# Patient Record
Sex: MALE | Race: WHITE | NOT HISPANIC OR LATINO | URBAN - METROPOLITAN AREA
[De-identification: names, ages, dates, MRNs, and addresses within clinical notes are randomized per-mention and may not be internally consistent; named-entity substitution may affect disease eponyms.]

---

## 2018-11-19 ENCOUNTER — OFFICE VISIT - HEALTHEAST (OUTPATIENT)
Dept: FAMILY MEDICINE | Facility: CLINIC | Age: 28
End: 2018-11-19

## 2018-11-19 DIAGNOSIS — Z82.49 FAM HX-ISCHEM HEART DISEASE: ICD-10-CM

## 2018-11-19 DIAGNOSIS — Z00.00 ROUTINE GENERAL MEDICAL EXAMINATION AT A HEALTH CARE FACILITY: ICD-10-CM

## 2018-11-19 DIAGNOSIS — F41.9 ANXIETY: ICD-10-CM

## 2018-11-19 ASSESSMENT — MIFFLIN-ST. JEOR: SCORE: 2028.8

## 2018-11-19 NOTE — ASSESSMENT & PLAN NOTE
Reduction in alcohol recommended.  Patient is not interested in the laboratory test today.  We will plan on fasting lipids at his annual visit next year.

## 2018-11-19 NOTE — ASSESSMENT & PLAN NOTE
The patient has been prescribed medications in the past.  He is not interested in any medications today.  Anxiety continues to be intermittent and at times bothersome with somatic symptoms.  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  He is planning on discontinuing his participation in the [employer].  -Follow-up as needed per patient criteria.  -I recommended a reduction in alcohol consumption.

## 2018-11-19 NOTE — ASSESSMENT & PLAN NOTE
Weight is been stable.  Previous measurements for diabetic screening and hypercholesterolemia screening have been normal/negative.  Will defer blood tests until next year.  Follow-up as needed for support with anxiety.  Family history reviewed.

## 2021-06-02 VITALS — WEIGHT: 229 LBS | HEIGHT: 72 IN | BODY MASS INDEX: 31.02 KG/M2

## 2021-06-16 PROBLEM — Z00.00 ROUTINE GENERAL MEDICAL EXAMINATION AT A HEALTH CARE FACILITY: Status: ACTIVE | Noted: 2018-11-19

## 2021-06-26 NOTE — PROGRESS NOTES
Progress Notes by Kevin Jones MD at 11/19/2018  1:00 PM     Author: Kevin Jones MD Service: -- Author Type: Physician    Filed: 11/19/2018  2:33 PM Encounter Date: 11/19/2018 Status: Signed    : Kevin Jones MD (Physician)       MALE PREVENTATIVE EXAM    Assessment and Plan:       Problem List Items Addressed This Visit     Anxiety     The patient has been prescribed medications in the past.  He is not interested in any medications today.  Anxiety continues to be intermittent and at times bothersome with somatic symptoms.  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  He is planning on discontinuing his participation in the [employer].  -Follow-up as needed per patient criteria.  -I recommended a reduction in alcohol consumption.         Fam hx-ischem heart disease     Reduction in alcohol recommended.  Patient is not interested in the laboratory test today.  We will plan on fasting lipids at his annual visit next year.           Routine general medical examination at a health care facility - Primary     Weight is been stable.  Previous measurements for diabetic screening and hypercholesterolemia screening have been normal/negative.  Will defer blood tests until next year.  Follow-up as needed for support with anxiety.  Family history reviewed.             Next follow up:  Return in about 1 year (around 11/19/2019) for Anxiety, Annual physical, fasting lab work..    Immunization Review  Adult Imm Review: No immunizations due today  Appropriate consumption of alcohol advice given as per AVS.    BMI: Weight loss recommended.  I specifically recommended that he reduce alcohol consumption, the consumption of processed/refined carbohydrates.  Pt does not use tobacco products   I discussed the following with the patient:   Adult Healthy Living: Importance of regular exercise  Healthy nutrition  Getting  adequate sleep  Supplement use  Herbal medications/alternative medical therapies    I have had an Advance Directives discussion with the patient.    Subjective:   Chief Complaint: Izaiah Munguia is an 27 y.o. male here for a preventative health visit.     HPI:  Stress.  He is hoping to get out of the  soon.  [employer].  Most health concerns are stress related.  He has been prescribed paxil in the past but did not really take it.     Healthy Habits  Are you taking a daily aspirin? No  Do you typically exercising at least 40 min, 3-4 times per week?  Yes  Do you usually eat at least 4 servings of fruit and vegetables a day, include whole grains and fiber and avoid regularly eating high fat foods? Yes  Have you had an eye exam in the past two years? Yes  Do you see a dentist twice per year? Yes  Do you have any concerns regarding sleep? No    Safety Screen  If you own firearms, are they secured in a locked gun cabinet or with trigger locks? NO  Do you feel you are safe where you are living?: Yes (11/19/2018  1:11 PM)  Do you feel you are safe in your relationship(s)?: Yes (11/19/2018  1:11 PM)     Review of Systems:  Please see above.  The rest of the review of systems are negative for all systems.     Cancer Screening     Patient has no health maintenance due at this time          Patient Care Team:  Kevin Jones MD as PCP - General (Family Medicine)        History     Reviewed By Date/Time Sections Reviewed    Kevin Jones MD 11/19/2018  1:24 PM Tobacco, Alcohol, Drug Use, Sexual Activity    Kevin Jones MD 11/19/2018  1:22 PM Surgical    Kevni Jones MD 11/19/2018  1:21 PM Medical    Radha Holley LPN 11/19/2018  1:11 PM Tobacco    Radha Holley LPN 11/19/2018  1:09 PM Tobacco    Radha Holley LPN 11/19/2018  1:08 PM Family    Radha Holley LPN 11/19/2018  1:07 PM Surgical, Family    Radha Holley LPN 11/19/2018  1:06 PM Tobacco, Alcohol,  "Drug Use, Sexual Activity            Objective:   Vital Signs:   Visit Vitals  /80 (Patient Site: Right Arm, Patient Position: Sitting, Cuff Size: Adult Large)   Pulse 88   Resp 20   Ht 5' 11.5\" (1.816 m)   Wt (!) 229 lb (103.9 kg)   SpO2 98% Comment: room air   BMI 31.49 kg/m      PHYSICAL EXAM  Physical Exam   Constitutional: He is oriented to person, place, and time. He appears well-developed. No distress.   HENT:   Head: Normocephalic and atraumatic.   Right Ear: External ear normal.   Left Ear: External ear normal.   Nose: Nose normal.   Mouth/Throat: Oropharynx is clear and moist. No oropharyngeal exudate.   Eyes: Conjunctivae and EOM are normal. Pupils are equal, round, and reactive to light. Right eye exhibits no discharge. Left eye exhibits no discharge. No scleral icterus.   Neck: Normal range of motion. No thyromegaly present.   Cardiovascular: Normal rate, regular rhythm, normal heart sounds and intact distal pulses. Exam reveals no gallop and no friction rub.   No murmur heard.  Pulmonary/Chest: Effort normal and breath sounds normal. No respiratory distress. He has no wheezes.   Abdominal: Soft. He exhibits no distension and no mass. There is no tenderness.   Genitourinary: Testes normal. Right testis shows no mass. Left testis shows no mass.         Musculoskeletal: Normal range of motion. He exhibits no edema.   Lymphadenopathy:     He has no cervical adenopathy.   Neurological: He is alert and oriented to person, place, and time. He has normal reflexes. No cranial nerve deficit. He exhibits normal muscle tone.   Skin: Skin is warm.   Psychiatric: He has a normal mood and affect. His behavior is normal. Judgment and thought content normal.   Vitals reviewed.       Medication List      as of 11/19/2018  2:32 PM     You have not been prescribed any medications.         Additional Screenings Completed Today:          "

## 2021-08-22 ENCOUNTER — HEALTH MAINTENANCE LETTER (OUTPATIENT)
Age: 31
End: 2021-08-22

## 2021-10-17 ENCOUNTER — HEALTH MAINTENANCE LETTER (OUTPATIENT)
Age: 31
End: 2021-10-17

## 2022-08-29 ENCOUNTER — OFFICE VISIT (OUTPATIENT)
Dept: FAMILY MEDICINE | Facility: CLINIC | Age: 32
End: 2022-08-29
Payer: COMMERCIAL

## 2022-08-29 VITALS
WEIGHT: 250.7 LBS | SYSTOLIC BLOOD PRESSURE: 136 MMHG | OXYGEN SATURATION: 99 % | TEMPERATURE: 98.4 F | HEART RATE: 100 BPM | HEIGHT: 72 IN | DIASTOLIC BLOOD PRESSURE: 82 MMHG | RESPIRATION RATE: 16 BRPM | BODY MASS INDEX: 33.96 KG/M2

## 2022-08-29 DIAGNOSIS — H61.21 IMPACTED CERUMEN OF RIGHT EAR: ICD-10-CM

## 2022-08-29 DIAGNOSIS — Z13.220 SCREENING FOR LIPID DISORDERS: ICD-10-CM

## 2022-08-29 DIAGNOSIS — Z00.00 ROUTINE GENERAL MEDICAL EXAMINATION AT A HEALTH CARE FACILITY: Primary | ICD-10-CM

## 2022-08-29 DIAGNOSIS — Z13.1 SCREENING FOR DIABETES MELLITUS: ICD-10-CM

## 2022-08-29 PROCEDURE — 99385 PREV VISIT NEW AGE 18-39: CPT | Mod: 25 | Performed by: FAMILY MEDICINE

## 2022-08-29 PROCEDURE — 69209 REMOVE IMPACTED EAR WAX UNI: CPT | Performed by: FAMILY MEDICINE

## 2022-08-29 ASSESSMENT — ENCOUNTER SYMPTOMS
JOINT SWELLING: 0
PARESTHESIAS: 0
DIZZINESS: 0
NAUSEA: 0
HEMATURIA: 0
CONSTIPATION: 0
HEMATOCHEZIA: 0
DIARRHEA: 0
NERVOUS/ANXIOUS: 0
WEAKNESS: 0
COUGH: 0
HEARTBURN: 0
EYE PAIN: 0
ABDOMINAL PAIN: 0
FEVER: 0
FREQUENCY: 0
CHILLS: 0
ARTHRALGIAS: 0
SHORTNESS OF BREATH: 0
PALPITATIONS: 0
DYSURIA: 0
HEADACHES: 0
SORE THROAT: 0
MYALGIAS: 0

## 2022-08-29 NOTE — ASSESSMENT & PLAN NOTE
The patient made some but not all of the criteria for metabolic syndrome.  Fasting lab work is indicated.  We discussed alcohol in the context of metabolic syndrome.  Lifestyle is sedentary.  Discussed adding different modalities of exercise.  Tetanus booster will be administered next week (currently out of stock in clinic).  We will plan for follow-up visit in 1 year to review health improvements.

## 2022-08-29 NOTE — PROGRESS NOTES
SUBJECTIVE:   CC: Izaiah Munguia is an 31 year old male who presents for preventative health visit.     Ear on the right side is plugged.  History of stress.    Nutrution:   - sedentary.  Works from home.  Works from [employer].  ~40+ hours/week.   - enjoys cooks, wine.  Enjoys eating veggies.    - he has a routine that includes three meals.  Sometimes a protein based breakfast.  Brines sandwich. Dinner   - alcohol: 2-3 drinks per day   - sleep: 9-10pm. Up with daughter at night.    - has  daughter.    Patient has been advised of split billing requirements and indicates understanding: Yes  Healthy Habits:     Getting at least 3 servings of Calcium per day:  Yes    Bi-annual eye exam:  NO    Dental care twice a year:  NO    Sleep apnea or symptoms of sleep apnea:  None    Diet:  Regular (no restrictions)    Frequency of exercise:  2-3 days/week    Duration of exercise:  30-45 minutes    Taking medications regularly:  Yes    Medication side effects:  Not applicable    PHQ-2 Total Score: 0    Additional concerns today:  No    Today's PHQ-2 Score:   PHQ-2 (  [employer]) 2022   Q1: Little interest or pleasure in doing things 0   Q2: Feeling down, depressed or hopeless 0   PHQ-2 Score 0   Q1: Little interest or pleasure in doing things Not at all   Q2: Feeling down, depressed or hopeless Not at all   PHQ-2 Score 0     Abuse: Current or Past(Physical, Sexual or Emotional)- No  do you feel safe in your environment? Yes    Social History     Tobacco Use     Smoking status: Former Smoker     Smokeless tobacco: Never Used     Tobacco comment: Cigars   Substance Use Topics     Alcohol use: Yes     Alcohol/week: 20.0 standard drinks     Comment: Alcoholic Drinks/day: variable.  He says that he is drinking less (2018)     If you drink alcohol do you typically have >3 drinks per day or >7 drinks per week? Yes      Alcohol Use 2022   Prescreen: >3 drinks/day or >7 drinks/week? Yes   Prescreen: >3 drinks/day  or >7 drinks/week? -   AUDIT SCORE  6     AUDIT - Alcohol Use Disorders Identification Test - Reproduced from the World Health Organization Audit 2001 (Second Edition) 8/29/2022   1.  How often do you have a drink containing alcohol? 2 to 3 times a week   2.  How many drinks containing alcohol do you have on a typical day when you are drinking? 3 or 4   3.  How often do you have five or more drinks on one occasion? Monthly   4.  How often during the last year have you found that you were not able to stop drinking once you had started? Never   5.  How often during the last year have you failed to do what was normally expected of you because of drinking? Never   6.  How often during the last year have you needed a first drink in the morning to get yourself going after a heavy drinking session? Never   7.  How often during the last year have you had a feeling of guilt or remorse after drinking? Never   8.  How often during the last year have you been unable to remember what happened the night before because of your drinking? Never   9.  Have you or someone else been injured because of your drinking? No   10. Has a relative, friend, doctor or other health care worker been concerned about your drinking or suggested you cut down? No   TOTAL SCORE 6       Last PSA: No results found for: PSA    Reviewed orders with patient. Reviewed health maintenance and updated orders accordingly - Yes      Reviewed and updated as needed this visit by clinical staff   Tobacco  Allergies  Meds  Problems  Med Hx  Surg Hx             Reviewed and updated as needed this visit by Provider   Tobacco  Allergies   Problems  Med Hx  Surg Hx              Review of Systems   Constitutional: Negative for chills and fever.   HENT: Negative for congestion, ear pain, hearing loss and sore throat.    Eyes: Negative for pain and visual disturbance.   Respiratory: Negative for cough and shortness of breath.    Cardiovascular: Negative for chest  "pain, palpitations and peripheral edema.   Gastrointestinal: Negative for abdominal pain, constipation, diarrhea, heartburn, hematochezia and nausea.   Genitourinary: Negative for dysuria, frequency, genital sores, hematuria and urgency.   Musculoskeletal: Negative for arthralgias, joint swelling and myalgias.   Skin: Negative for rash.   Neurological: Negative for dizziness, weakness, headaches and paresthesias.   Psychiatric/Behavioral: Negative for mood changes. The patient is not nervous/anxious.      OBJECTIVE:   /82 (BP Location: Left arm, Patient Position: Sitting, Cuff Size: Adult Large)   Pulse 100   Temp 98.4  F (36.9  C) (Oral)   Resp 16   Ht 1.822 m (5' 11.75\")   Wt 113.7 kg (250 lb 11.2 oz)   SpO2 99%   BMI 34.24 kg/m      Physical Exam  Vitals reviewed.   Constitutional:       General: He is not in acute distress.     Appearance: Normal appearance.   HENT:      Head: Normocephalic and atraumatic.      Right Ear: There is impacted cerumen.      Left Ear: External ear normal.      Ears:      Comments: The right external auditory canal was initially completely occluded with cerumen.  After irrigation, it was clear.  The tympanic membrane was without abnormalities.     Nose: Nose normal.      Mouth/Throat:      Pharynx: No oropharyngeal exudate or posterior oropharyngeal erythema.   Eyes:      General: No scleral icterus.  Cardiovascular:      Rate and Rhythm: Normal rate and regular rhythm.      Heart sounds: Normal heart sounds. No murmur heard.    No friction rub. No gallop.   Pulmonary:      Effort: Pulmonary effort is normal. No respiratory distress.      Breath sounds: No wheezing.   Abdominal:      General: Bowel sounds are normal. There is no distension.      Palpations: Abdomen is soft. There is no mass.      Tenderness: There is no abdominal tenderness.   Musculoskeletal:         General: No swelling. Normal range of motion.      Cervical back: Normal range of motion.   Skin:     " "Findings: No rash.   Neurological:      General: No focal deficit present.      Mental Status: He is alert and oriented to person, place, and time.      Cranial Nerves: No cranial nerve deficit.      Deep Tendon Reflexes: Reflexes normal.   Psychiatric:         Mood and Affect: Mood normal.         Behavior: Behavior normal.         Thought Content: Thought content normal.         Judgment: Judgment normal.     Diagnostic Test Results:  Labs reviewed in Epic    ASSESSMENT/PLAN:     Problem List Items Addressed This Visit     Routine general medical examination at a health care facility - Primary     The patient made some but not all of the criteria for metabolic syndrome.  Fasting lab work is indicated.  We discussed alcohol in the context of metabolic syndrome.  Lifestyle is sedentary.  Discussed adding different modalities of exercise.  Tetanus booster will be administered next week (currently out of stock in clinic).  We will plan for follow-up visit in 1 year to review health improvements.           Relevant Orders    Basic metabolic panel  (Ca, Cl, CO2, Creat, Gluc, K, Na, BUN)    ALT    Lipid Profile (Chol, Trig, HDL, LDL calc)    AST      Other Visit Diagnoses     Screening for lipid disorders        Relevant Orders    ALT    Lipid Profile (Chol, Trig, HDL, LDL calc)    AST    Screening for diabetes mellitus        Relevant Orders    Basic metabolic panel  (Ca, Cl, CO2, Creat, Gluc, K, Na, BUN)    Impacted cerumen of right ear        Relevant Orders    REMOVE IMPACTED CERUMEN (Completed)          Patient has been advised of split billing requirements and indicates understanding: Yes    COUNSELING:   Reviewed preventive health counseling, as reflected in patient instructions       Regular exercise       Healthy diet/nutrition    Estimated body mass index is 34.24 kg/m  as calculated from the following:    Height as of this encounter: 1.822 m (5' 11.75\").    Weight as of this encounter: 113.7 kg (250 lb 11.2 " oz).     Weight management plan: Discussed healthy diet and exercise guidelines    He reports that he has quit smoking. He has never used smokeless tobacco.      Counseling Resources:  ATP IV Guidelines  Pooled Cohorts Equation Calculator  FRAX Risk Assessment  ICSI Preventive Guidelines  Dietary Guidelines for Americans, 2010  USDA's MyPlate  ASA Prophylaxis  Lung CA Screening    Kevin Jones MD  [facility]

## 2022-09-09 ENCOUNTER — ALLIED HEALTH/NURSE VISIT (OUTPATIENT)
Dept: FAMILY MEDICINE | Facility: CLINIC | Age: 32
End: 2022-09-09

## 2022-09-09 ENCOUNTER — LAB (OUTPATIENT)
Dept: LAB | Facility: CLINIC | Age: 32
End: 2022-09-09
Payer: COMMERCIAL

## 2022-09-09 DIAGNOSIS — Z13.220 SCREENING FOR LIPID DISORDERS: ICD-10-CM

## 2022-09-09 DIAGNOSIS — Z13.1 SCREENING FOR DIABETES MELLITUS: ICD-10-CM

## 2022-09-09 DIAGNOSIS — Z00.00 ROUTINE GENERAL MEDICAL EXAMINATION AT A HEALTH CARE FACILITY: ICD-10-CM

## 2022-09-09 DIAGNOSIS — Z23 NEED FOR VACCINATION: Primary | ICD-10-CM

## 2022-09-09 LAB
ALT SERPL W P-5'-P-CCNC: 75 U/L (ref 10–50)
ANION GAP SERPL CALCULATED.3IONS-SCNC: 9 MMOL/L (ref 7–15)
AST SERPL W P-5'-P-CCNC: 40 U/L (ref 10–50)
BUN SERPL-MCNC: 14.7 MG/DL (ref 6–20)
CALCIUM SERPL-MCNC: 10 MG/DL (ref 8.6–10)
CHLORIDE SERPL-SCNC: 101 MMOL/L (ref 98–107)
CHOLEST SERPL-MCNC: 261 MG/DL
CREAT SERPL-MCNC: 1.01 MG/DL (ref 0.67–1.17)
DEPRECATED HCO3 PLAS-SCNC: 26 MMOL/L (ref 22–29)
GFR SERPL CREATININE-BSD FRML MDRD: >90 ML/MIN/1.73M2
GLUCOSE SERPL-MCNC: 100 MG/DL (ref 70–99)
HDLC SERPL-MCNC: 40 MG/DL
LDLC SERPL CALC-MCNC: ABNORMAL MG/DL
NONHDLC SERPL-MCNC: 221 MG/DL
POTASSIUM SERPL-SCNC: 4.1 MMOL/L (ref 3.4–5.3)
SODIUM SERPL-SCNC: 136 MMOL/L (ref 136–145)
TRIGL SERPL-MCNC: 449 MG/DL

## 2022-09-09 PROCEDURE — 80061 LIPID PANEL: CPT

## 2022-09-09 PROCEDURE — 90715 TDAP VACCINE 7 YRS/> IM: CPT

## 2022-09-09 PROCEDURE — 99207 PR NO CHARGE NURSE ONLY: CPT

## 2022-09-09 PROCEDURE — 90471 IMMUNIZATION ADMIN: CPT

## 2022-09-09 PROCEDURE — 36415 COLL VENOUS BLD VENIPUNCTURE: CPT

## 2022-09-09 PROCEDURE — 80048 BASIC METABOLIC PNL TOTAL CA: CPT

## 2022-09-09 PROCEDURE — 84450 TRANSFERASE (AST) (SGOT): CPT

## 2022-09-09 PROCEDURE — 84460 ALANINE AMINO (ALT) (SGPT): CPT

## 2022-09-09 NOTE — PROGRESS NOTES
Prior to immunization administration, verified patients identity using patient s name and date of birth. Please see Immunization Activity for additional information.     Screening Questionnaire for Adult Immunization    Are you sick today?   No   Do you have allergies to medications, food, a vaccine component or latex?   No   Have you ever had a serious reaction after receiving a vaccination?   No   Do you have a long-term health problem with heart, lung, kidney, or metabolic disease (e.g., diabetes), asthma, a blood disorder, no spleen, complement component deficiency, a cochlear implant, or a spinal fluid leak?  Are you on long-term aspirin therapy?   No   Do you have cancer, leukemia, HIV/AIDS, or any other immune system problem?   No   Do you have a parent, brother, or sister with an immune system problem?   No   In the past 3 months, have you taken medications that affect  your immune system, such as prednisone, other steroids, or anticancer drugs; drugs for the treatment of rheumatoid arthritis, Crohn s disease, or psoriasis; or have you had radiation treatments?   No   Have you had a seizure, or a brain or other nervous system problem?   No   During the past year, have you received a transfusion of blood or blood    products, or been given immune (gamma) globulin or antiviral drug?   No   For women: Are you pregnant or is there a chance you could become       pregnant during the next month?   No   Have you received any vaccinations in the past 4 weeks?   No     Immunization questionnaire answers were all negative.        Per orders of Dr. Mercado, injection of tdap given by Araceli Baer. Patient instructed to remain in clinic for 15 minutes afterwards, and to report any adverse reaction to me immediately.       Screening performed by Araceli Baer on 9/9/2022 at 8:47 AM.

## 2022-10-02 ENCOUNTER — HEALTH MAINTENANCE LETTER (OUTPATIENT)
Age: 32
End: 2022-10-02

## 2023-07-31 ENCOUNTER — PATIENT OUTREACH (OUTPATIENT)
Dept: CARE COORDINATION | Facility: CLINIC | Age: 33
End: 2023-07-31
Payer: COMMERCIAL

## 2023-08-14 ENCOUNTER — PATIENT OUTREACH (OUTPATIENT)
Dept: CARE COORDINATION | Facility: CLINIC | Age: 33
End: 2023-08-14
Payer: COMMERCIAL

## 2023-10-16 ENCOUNTER — IMMUNIZATION (OUTPATIENT)
Dept: FAMILY MEDICINE | Facility: CLINIC | Age: 33
End: 2023-10-16
Payer: COMMERCIAL

## 2023-10-16 PROCEDURE — 91320 SARSCV2 VAC 30MCG TRS-SUC IM: CPT

## 2023-10-16 PROCEDURE — 90471 IMMUNIZATION ADMIN: CPT

## 2023-10-16 PROCEDURE — 90480 ADMN SARSCOV2 VAC 1/ONLY CMP: CPT

## 2023-10-16 PROCEDURE — 90686 IIV4 VACC NO PRSV 0.5 ML IM: CPT

## 2023-10-21 ENCOUNTER — HEALTH MAINTENANCE LETTER (OUTPATIENT)
Age: 33
End: 2023-10-21

## 2023-12-06 ENCOUNTER — OFFICE VISIT (OUTPATIENT)
Dept: PODIATRY | Facility: CLINIC | Age: 33
End: 2023-12-06
Payer: COMMERCIAL

## 2023-12-06 VITALS
OXYGEN SATURATION: 99 % | DIASTOLIC BLOOD PRESSURE: 78 MMHG | HEART RATE: 111 BPM | RESPIRATION RATE: 18 BRPM | SYSTOLIC BLOOD PRESSURE: 138 MMHG

## 2023-12-06 DIAGNOSIS — B07.0 VERRUCA PLANTARIS: Primary | ICD-10-CM

## 2023-12-06 PROCEDURE — 99203 OFFICE O/P NEW LOW 30 MIN: CPT | Mod: 25 | Performed by: PODIATRIST

## 2023-12-06 PROCEDURE — 17111 DESTRUCTION B9 LESIONS 15/>: CPT | Performed by: PODIATRIST

## 2023-12-06 ASSESSMENT — PAIN SCALES - GENERAL: PAINLEVEL: MODERATE PAIN (5)

## 2023-12-06 NOTE — PROGRESS NOTES
FOOT AND ANKLE SURGERY/PODIATRY CONSULT NOTE         ASSESSMENT:   Verruca plantaris bilateral feet      TREATMENT:  The patient was treated with cantharidin today.  I informed the patient it may take several applications to successfully treat these warts.  Also recommended a homeopathic medication called  Thuja which can be applied.  He is to return to clinic as needed.          HPI:  Tyrone Munguia presented to the clinic today complaining of large painful wart on the bottom of the left foot near his big toe.  The patient indicated that he has had these warts for at least 2 years.  He has tried some over-the-counter medications with very little relief.  He stated that the wart over the past several months has become quite painful with weightbearing and ambulation.  He denies any drainage or bleeding.  He has no pain while resting.  He denies any trauma to his foot.  He denies any other previous treatment..       Past Medical History:   Diagnosis Date    Anxiety        Social History     Socioeconomic History    Marital status:      Spouse name: Not on file    Number of children: Not on file    Years of education: Not on file    Highest education level: Not on file   Occupational History    Not on file   Tobacco Use    Smoking status: Former    Smokeless tobacco: Never    Tobacco comments:     Cigars   Substance and Sexual Activity    Alcohol use: Yes     Alcohol/week: 20.0 standard drinks of alcohol     Comment: Alcoholic Drinks/day: variable.  He says that he is drinking less (11/2018)    Drug use: No    Sexual activity: Yes     Partners: Female   Other Topics Concern    Not on file   Social History Narrative    Not on file     Social Determinants of Health     Financial Resource Strain: Not on file   Food Insecurity: Not on file   Transportation Needs: Not on file   Physical Activity: Not on file   Stress: Not on file   Social Connections: Not on file   Interpersonal Safety: Not on file   Housing  "Stability: Not on file        No Known Allergies     No current outpatient medications on file.     Family History   Problem Relation Age of Onset    No Known Problems Mother     Heart Disease Father 50        \"Minor.\"    Diabetes Father     Prostate Cancer Maternal Grandfather     Heart Disease Paternal Grandfather     No Known Problems Sister     No Known Problems Daughter     Breast Cancer No family hx of     Colon Cancer No family hx of     Colorectal Cancer No family hx of         Social History     Socioeconomic History    Marital status:      Spouse name: Not on file    Number of children: Not on file    Years of education: Not on file    Highest education level: Not on file   Occupational History    Not on file   Tobacco Use    Smoking status: Former    Smokeless tobacco: Never    Tobacco comments:     Cigars   Substance and Sexual Activity    Alcohol use: Yes     Alcohol/week: 20.0 standard drinks of alcohol     Comment: Alcoholic Drinks/day: variable.  He says that he is drinking less (11/2018)    Drug use: No    Sexual activity: Yes     Partners: Female   Other Topics Concern    Not on file   Social History Narrative    Not on file     Social Determinants of Health     Financial Resource Strain: Not on file   Food Insecurity: Not on file   Transportation Needs: Not on file   Physical Activity: Not on file   Stress: Not on file   Social Connections: Not on file   Interpersonal Safety: Not on file   Housing Stability: Not on file        Review of Systems - Patient denies fever, chills, rash, wound, stiffness, limping, numbness, weakness, heart burn, blood in stool, chest pain with activity, calf pain when walking, shortness of breath with activity, chronic cough, easy bleeding/bruising, swelling of ankles, excessive thirst, fatigue, depression, anxiety.  Patient admits to warts left foot.      OBJECTIVE:  Appearance: alert, well appearing, and in no distress.    /78   Pulse 111   Resp 18   " SpO2 99%      There is no height or weight on file to calculate BMI.     General appearance: Patient is alert and fully cooperative with history & exam.  No sign of distress is noted during the visit.  Psychiatric: Affect is pleasant & appropriate.  Patient appears motivated to improve health.  Respiratory: Breathing is regular & unlabored while sitting.  HEENT: Hearing is intact to spoken word.  Speech is clear.  No gross evidence of visual impairment that would impact ambulation.    Vascular: Dorsalis pedis and posterior tibial pulses are palpable. There is pedal hair growth bilaterally.  CFT < 3 sec from anterior tibial surface to distal digits bilaterally. There is no appreciable edema noted.  Dermatologic: Large mosaic wart with several satellite noted plantar aspect left foot.  There is a small single wart on the plantar aspect of the right foot.  Turgor and texture are within normal limits. No coloration or temperature changes. No primary or secondary lesions noted.  Neurologic: All epicritic and proprioceptive sensations are grossly intact bilaterally.  Musculoskeletal: All active and passive ankle, subtalar, midtarsal, and 1st MPJ range of motion are grossly intact without pain or crepitus, with the exception of none. Manual muscle strength is within normal limits bilaterally. All dorsiflexors, plantarflexors, invertors, evertors are intact bilaterally.  Mild tenderness present to plantar aspect left foot on palpation.  No tenderness to bilateral feet or ankles with range of motion. Calf is soft/non-tender without warmth/induration    Imaging:       No images are attached to the encounter or orders placed in the encounter.     No results found.   No results found.         Jesus Castañeda DPM  [facility] Foot & Ankle Surgery/Podiatry

## 2023-12-06 NOTE — PATIENT INSTRUCTIONS
CANTHARIDIN WART      Wart ([place])  Definition   A wart is a small growth on the skin that develops when the skin is infected by a virus. Plantar warts are caused by direct contact with the human papilloma virus (HPV). This is the same virus that causes warts on other areas of the body and is acquired in public places where people go barefoot, such as locker rooms and swimming pools.  It can also be acquired at home if other family members have the virus. Warts can develop anywhere on the foot but usually occur on the bottom (plantar) of the foot.  Plantar warts most commonly occur in children, adolescents, and the elderly.  Common warts found on the feet include plantar warts, solitary warts, and mosaic warts.  What to look for   Characteristics of warts include:  Callus tissue with small black dots.  The black dots are blood vessels that bring nutrients to the wart to help it survive and spread.   Pain when the wart is squeezed from side to side.    Cantharidin is a substance that comes from the green blister beetle.  Cantharidin is a vesicant, which means it causes the skin to blister. It is applied to a wart, which is then  covered by a bandage. You should remove the Band-Aid and wash the area with soap and water  after about 6 hours. Over the next 12-24 hours, it may cause the skin to blister. This lifts the wart off  the skin, so the wart will come off when the blister dries. Often, the skin does not blister and a small  crust or scab may form.  Although Cantharidin does not hurt when applied, it may cause mild tingling, itching, or burning within  a few hours. It is normal to see slight red or inflamed skin around the blister, and for the blister to be  filled with blood. The treated area may be tender for a few days.  The area where Cantharidin was applied should be kept clean.      Purchased a homeopathic medication called Thuja to continue treating your warts at home.

## 2023-12-06 NOTE — Clinical Note
"    12/6/2023         RE: Tyrone Munguia  [address] [facility] [place] [facility] [address]        Dear Colleague,    Thank you for referring your patient, Tyrone Munguia, to the [facility]. Please see a copy of my visit note below.    FOOT AND ANKLE SURGERY/PODIATRY CONSULT NOTE         ASSESSMENT:   ***      TREATMENT:  ***        HPI: I was asked to see Tyrone Munguia today  ***.  The patient was seen in consultation at the request of *** for ***.     {PAST MEDICAL HISTORY:[id number]::\"***\"}    {SOCIAL HISTORY:[id number]::\"***\"}     No Known Allergies     No current outpatient medications on file.     Family History   Problem Relation Age of Onset     No Known Problems Mother      Heart Disease Father 50        \"Minor.\"     Diabetes Father      Prostate Cancer Maternal Grandfather      Heart Disease Paternal Grandfather      No Known Problems Sister      No Known Problems Daughter      Breast Cancer No family hx of      Colon Cancer No family hx of      Colorectal Cancer No family hx of         Social History     Socioeconomic History     Marital status:      Spouse name: Not on file     Number of children: Not on file     Years of education: Not on file     Highest education level: Not on file   Occupational History     Not on file   Tobacco Use     Smoking status: Former     Smokeless tobacco: Never     Tobacco comments:     Cigars   Substance and Sexual Activity     Alcohol use: Yes     Alcohol/week: 20.0 standard drinks of alcohol     Comment: Alcoholic Drinks/day: variable.  He says that he is drinking less (11/2018)     Drug use: No     Sexual activity: Yes     Partners: Female   Other Topics Concern     Not on file   Social History Narrative     Not on file     Social Determinants of Health     Financial Resource Strain: Not on file   Food Insecurity: Not on file   Transportation Needs: Not on file   Physical Activity: Not on file   Stress: Not on file   Social Connections: " "Not on file   Interpersonal Safety: Not on file   Housing Stability: Not on file        Review of Systems - Patient denies fever, chills, rash, wound, stiffness, limping, numbness, weakness, heart burn, blood in stool, chest pain with activity, calf pain when walking, shortness of breath with activity, chronic cough, easy bleeding/bruising, swelling of ankles, excessive thirst, fatigue, depression, anxiety.  Patient admits to ***.      OBJECTIVE:  Appearance: {appearance:259655::\"alert, well appearing, and in no distress\"}.    /78   Pulse 111   Resp 18   SpO2 99%      There is no height or weight on file to calculate BMI.     General appearance: Patient is alert and fully cooperative with history & exam.  No sign of distress is noted during the visit.  Psychiatric: Affect is pleasant & appropriate.  Patient appears motivated to improve health.  Respiratory: Breathing is regular & unlabored while sitting.  HEENT: Hearing is intact to spoken word.  Speech is clear.  No gross evidence of visual impairment that would impact ambulation.    Vascular: Dorsalis pedis and posterior tibial pulses are palpable. There is pedal hair growth ***.  CFT < 3 sec from anterior tibial surface to distal digits ***. There is no appreciable edema noted.  Dermatologic: Turgor and texture are within normal limits. No coloration or temperature changes. No primary or secondary lesions noted.  Neurologic: All epicritic and proprioceptive sensations are grossly intact ***.  Musculoskeletal: All active and passive ankle, subtalar, midtarsal, and 1st MPJ range of motion are grossly intact without pain or crepitus, with the exception of ***. Manual muscle strength is ***. All dorsiflexors, plantarflexors, invertors, evertors are intact ***. Tenderness present to *** on palpation. Tenderness to *** with range of motion. Calf is soft/non-tender with/without warmth/induration    Imaging:     {Imaging order/result:[id number]}  No images are " attached to the encounter or orders placed in the encounter.     No results found.   No results found.       TREATMENT:  ***    Jesus Castañeda DPM  [facility] Foot & Ankle Surgery/Podiatry         Again, thank you for allowing me to participate in the care of your patient.        Sincerely,        Jesus Lucas DPM

## 2024-11-06 ENCOUNTER — IMMUNIZATION (OUTPATIENT)
Dept: FAMILY MEDICINE | Facility: CLINIC | Age: 34
End: 2024-11-06
Payer: COMMERCIAL

## 2024-11-06 DIAGNOSIS — Z23 ENCOUNTER FOR IMMUNIZATION: Primary | ICD-10-CM

## 2024-11-06 NOTE — PROGRESS NOTES
Prior to immunization administration, verified patients identity using patient s name and date of birth. Please see Immunization Activity for additional information.     Is the patient's temperature normal (100.5 or less)? Yes     Patient MEETS CRITERIA. PROCEED with vaccine administration.      Patient instructed to remain in clinic for 15 minutes afterwards, and to report any adverse reactions.      Link to Ancillary Visit Immunization Standing Orders SmartSet     Screening performed by Silvana Yang MA on 11/6/2024 at 1:01 PM.

## 2024-12-14 ENCOUNTER — HEALTH MAINTENANCE LETTER (OUTPATIENT)
Age: 34
End: 2024-12-14